# Patient Record
Sex: FEMALE | Race: WHITE | NOT HISPANIC OR LATINO | Employment: FULL TIME | URBAN - METROPOLITAN AREA
[De-identification: names, ages, dates, MRNs, and addresses within clinical notes are randomized per-mention and may not be internally consistent; named-entity substitution may affect disease eponyms.]

---

## 2017-01-20 ENCOUNTER — OFFICE VISIT (OUTPATIENT)
Dept: MEDICAL GROUP | Facility: PHYSICIAN GROUP | Age: 52
End: 2017-01-20
Payer: OTHER GOVERNMENT

## 2017-01-20 VITALS
DIASTOLIC BLOOD PRESSURE: 74 MMHG | HEIGHT: 61 IN | BODY MASS INDEX: 23.41 KG/M2 | WEIGHT: 124 LBS | SYSTOLIC BLOOD PRESSURE: 98 MMHG | OXYGEN SATURATION: 96 % | HEART RATE: 40 BPM | TEMPERATURE: 97.5 F

## 2017-01-20 DIAGNOSIS — M85.80 OSTEOPENIA: ICD-10-CM

## 2017-01-20 DIAGNOSIS — J30.89 NON-SEASONAL ALLERGIC RHINITIS DUE TO OTHER ALLERGIC TRIGGER: ICD-10-CM

## 2017-01-20 DIAGNOSIS — L70.8 OTHER ACNE: ICD-10-CM

## 2017-01-20 PROCEDURE — 99214 OFFICE O/P EST MOD 30 MIN: CPT | Performed by: FAMILY MEDICINE

## 2017-01-20 NOTE — MR AVS SNAPSHOT
"Suzette Bazzicholo   2017 3:00 PM   Office Visit   MRN: [id number]    Department:  [facility]   Dept Phone:  [phone]    Description:  Female : 1965   Provider:  Ceci Flores D.O.           Allergies as of 2017     Allergen Noted Reactions    Bee Venom 2015         You were diagnosed with     Non-seasonal allergic rhinitis due to other allergic trigger   [1656971]       Other acne   [706.1.ICD-9-CM]       Osteopenia   [274483]         Vital Signs     Blood Pressure Pulse Temperature Height Weight Body Mass Index    98/74 mmHg 40 36.4 °C (97.5 °F) 1.549 m (5' 0.98\") 56.246 kg (124 lb) 23.44 kg/m2    Oxygen Saturation Last Menstrual Period Smoking Status             96% 2001 Current Some Day Smoker         Basic Information     Date Of Birth Sex Race Ethnicity Preferred Language    1965 Female White Unknown English      Problem List              ICD-10-CM Priority Class Noted - Resolved    Dyslipidemia E78.5   Unknown - Present    Genital herpes in women A60.09   2013 - Present    Allergic rhinitis due to allergen J30.9   2014 - Present    Acne L70.9   10/16/2014 - Present    Exercise-induced asthma J45.990   2015 - Present    Osteopenia M85.80   2015 - Present    Healthcare maintenance Z00.00   2015 - Present      Health Maintenance        Date Due Completion Dates    IMM INFLUENZA (1) 2016    PAP SMEAR 2018 (Originally 2016) 2013, 2013 (Done), 2010    Override on 2013: Done    MAMMOGRAM 2017, 2015, 10/15/2014, 2013, 2012, 2011    COLONOSCOPY 2023    IMM DTaP/Tdap/Td Vaccine (2 - Td) 10/2/2024 10/2/2014            Current Immunizations     Influenza TIV (IM) 2011    Influenza Vaccine 10/1/2010    Tdap Vaccine 10/2/2014      Below and/or attached are the medications your provider expects you to take. Review all of your home medications and " newly ordered medications with your provider and/or pharmacist. Follow medication instructions as directed by your provider and/or pharmacist. Please keep your medication list with you and share with your provider. Update the information when medications are discontinued, doses are changed, or new medications (including over-the-counter products) are added; and carry medication information at all times in the event of emergency situations     Allergies:  BEE VENOM - (reactions not documented)               Medications  Valid as of: January 20, 2017 -  3:34 PM    Generic Name Brand Name Tablet Size Instructions for use    Albuterol Sulfate (Aero Soln) albuterol 108 (90 BASE) MCG/ACT Inhale 2 Puffs by mouth every 6 hours as needed.        Erythromycin (Solution) THERAMYCIN 2 % APPLY TO FACE TWICE A DAY.        ValACYclovir HCl (Tab) VALTREX 1 GM Take 1 Tab by mouth 1 time daily as needed (take for 5 days when having an outbreak).        .                 Medicines prescribed today were sent to:     [facility]/PHARMACY #[id number] - [place], [place] - [address] [address]    Phone: [phone] Fax: [phone]    Open 24 Hours?: No      Medication refill instructions:       If your prescription bottle indicates you have medication refills left, it is not necessary to call your provider’s office. Please contact your pharmacy and they will refill your medication.    If your prescription bottle indicates you do not have any refills left, you may request refills at any time through one of the following ways: The online [employer]aborate system (except Urgent Care), by calling your provider’s office, or by asking your pharmacy to contact your provider’s office with a refill request. Medication refills are processed only during regular business hours and may not be available until the next business day. Your provider may request additional information or to have a follow-up visit with  you prior to refilling your medication.   *Please Note: Medication refills are assigned a new Rx number when refilled electronically. Your pharmacy may indicate that no refills were authorized even though a new prescription for the same medication is available at the pharmacy. Please request the medicine by name with the pharmacy before contacting your provider for a refill.        Your To Do List     Future Labs/Procedures Complete By Expires    COMP METABOLIC PANEL  As directed 1/21/2018    LIPID PROFILE  As directed 1/21/2018    VITAMIN D,25 HYDROXY  As directed 1/21/2018         [employer] Access Code: Activation code not generated  Current [employer] Status: Active

## 2017-01-21 ENCOUNTER — HOSPITAL ENCOUNTER (OUTPATIENT)
Dept: LAB | Facility: MEDICAL CENTER | Age: 52
End: 2017-01-21
Attending: FAMILY MEDICINE
Payer: OTHER GOVERNMENT

## 2017-01-21 DIAGNOSIS — M85.80 OSTEOPENIA: ICD-10-CM

## 2017-01-21 LAB
25(OH)D3 SERPL-MCNC: 32 NG/ML (ref 30–100)
ALBUMIN SERPL BCP-MCNC: 4.2 G/DL (ref 3.2–4.9)
ALBUMIN/GLOB SERPL: 1.4 G/DL
ALP SERPL-CCNC: 51 U/L (ref 30–99)
ALT SERPL-CCNC: 16 U/L (ref 2–50)
ANION GAP SERPL CALC-SCNC: 7 MMOL/L (ref 0–11.9)
AST SERPL-CCNC: 19 U/L (ref 12–45)
BILIRUB SERPL-MCNC: 0.6 MG/DL (ref 0.1–1.5)
BUN SERPL-MCNC: 18 MG/DL (ref 8–22)
CALCIUM SERPL-MCNC: 9.2 MG/DL (ref 8.5–10.5)
CHLORIDE SERPL-SCNC: 106 MMOL/L (ref 96–112)
CHOLEST SERPL-MCNC: 205 MG/DL (ref 100–199)
CO2 SERPL-SCNC: 25 MMOL/L (ref 20–33)
CREAT SERPL-MCNC: 0.9 MG/DL (ref 0.5–1.4)
GLOBULIN SER CALC-MCNC: 2.9 G/DL (ref 1.9–3.5)
GLUCOSE SERPL-MCNC: 86 MG/DL (ref 65–99)
HDLC SERPL-MCNC: 77 MG/DL
LDLC SERPL CALC-MCNC: 109 MG/DL
POTASSIUM SERPL-SCNC: 3.9 MMOL/L (ref 3.6–5.5)
PROT SERPL-MCNC: 7.1 G/DL (ref 6–8.2)
SODIUM SERPL-SCNC: 138 MMOL/L (ref 135–145)
TRIGL SERPL-MCNC: 96 MG/DL (ref 0–149)

## 2017-01-21 PROCEDURE — 36415 COLL VENOUS BLD VENIPUNCTURE: CPT

## 2017-01-21 PROCEDURE — 82306 VITAMIN D 25 HYDROXY: CPT

## 2017-01-21 PROCEDURE — 80061 LIPID PANEL: CPT

## 2017-01-21 PROCEDURE — 80053 COMPREHEN METABOLIC PANEL: CPT

## 2017-01-21 NOTE — ASSESSMENT & PLAN NOTE
Ongoing issue. Patient reports that she does have issues with stuffy nose and nasal congestion; she denies that it is bothersome to her most days. She denies any fevers or chills associated with this. In the past she has used Flonase but does not like the side effect of the strife scratchy throat.

## 2017-01-21 NOTE — ASSESSMENT & PLAN NOTE
Ongoing issue. Previous DEXA scan did show the patient had osteopenia. She denies any long bone or vertebral fractures. She currently is taking supplemental calcium and vitamin D.

## 2017-01-21 NOTE — ASSESSMENT & PLAN NOTE
Ongoing issue. Patient reports that she periodically uses topical erythromycin cream for flareups. She states that it is controlling her acne. Otherwise she is doing well. She says of acne primarily is on her face and neck and does not extend down to her chest or back. She currently does not have any partials of concern.

## 2017-01-21 NOTE — PROGRESS NOTES
"Subjective:   Suzette Anderson is a 51 y.o. female here today for acne; nasal congestion; osteopenia    Acne  Ongoing issue. Patient reports that she periodically uses topical erythromycin cream for flareups. She states that it is controlling her acne. Otherwise she is doing well. She says of acne primarily is on her face and neck and does not extend down to her chest or back. She currently does not have any partials of concern.    Allergic rhinitis due to allergen  Ongoing issue. Patient reports that she does have issues with stuffy nose and nasal congestion; she denies that it is bothersome to her most days. She denies any fevers or chills associated with this. In the past she has used Flonase but does not like the side effect of the strife scratchy throat.    Osteopenia  Ongoing issue. Previous DEXA scan did show the patient had osteopenia. She denies any long bone or vertebral fractures. She currently is taking supplemental calcium and vitamin D.     Pt is here today to [facility]; she is transferring care from [facility].     Current medicines (including changes today)  Current Outpatient Prescriptions   Medication Sig Dispense Refill   • valacyclovir (VALTREX) 1 GM TABS Take 1 Tab by mouth 1 time daily as needed (take for 5 days when having an outbreak). 20 Tab 3   • erythromycin with ethanol (THERAMYCIN) 2 % external solution APPLY TO FACE TWICE A DAY. (Patient not taking: Reported on 1/20/2017) 60 mL 0   • albuterol (PROAIR HFA) 108 (90 BASE) MCG/ACT AERS inhalation aerosol Inhale 2 Puffs by mouth every 6 hours as needed. 8.5 g 3     No current facility-administered medications for this visit.     She  has a past medical history of Osteopenia; Dyslipidemia; Acne; and Allergic rhinitis due to allergen (4/2/2014).    ROS   No chest pain, no shortness of breath, no abdominal pain  +nasal congestion     Objective:     Blood pressure 98/74, pulse 40, temperature 36.4 °C (97.5 °F), height 1.549 m (5' 0.98\"), " weight 56.246 kg (124 lb), last menstrual period 01/01/2001, SpO2 96 %. Body mass index is 23.44 kg/(m^2).   Physical Exam:  Alert, oriented in no acute distress.  Eye contact is good, speech goal directed, affect calm  HEENT: conjunctiva non-injected, sclera non-icteric.  Pinna normal. TM pearly gray.   Oral mucous membranes pink and moist with no lesions.  Neck No adenopathy or masses in the neck or supraclavicular regions.  Lungs: clear to auscultation bilaterally with good excursion.  CV: regular rate and rhythm.  Abdomen: soft, nontender, No CVAT  Ext: no edema, color normal, vascularity normal, temperature normal  Neuro: CN 2-12 grossly intact      Assessment and Plan:   The following treatment plan was discussed     1. Non-seasonal allergic rhinitis due to other allergic trigger      Stable. Recommend over-the-counter saline nasal wash for symptom management. Monitor   2. Other acne      Stable. Continue erythromycin cream as needed for management; patient denies need for refill. Monitor   3. Osteopenia  COMP METABOLIC PANEL    LIPID PROFILE    VITAMIN D,25 HYDROXY    Stable. Continue supplemental calcium and vitamin D; sent for age appropriate screening labs and monitor for results.       Followup: Return in about 1 year (around 1/20/2018) for annual physical , Short.

## 2017-01-23 ENCOUNTER — TELEPHONE (OUTPATIENT)
Dept: MEDICAL GROUP | Facility: PHYSICIAN GROUP | Age: 52
End: 2017-01-23

## 2017-01-23 NOTE — TELEPHONE ENCOUNTER
----- Message from Ceci Flores D.O. sent at 1/23/2017  7:47 AM PST -----  Please advise pt that all labs are in a normal range except the total cholesterol which is only slightly above normal.  Just watch intake of high fat foods and be sure to get 30 minutes of exercise daily.    Ceci Flores D.O.

## 2017-01-23 NOTE — Clinical Note
January 23, 2017         Suzette Anderson  [address] [place] [address]        Dear Suzette:    All labs are in a normal range except the total cholesterol which is only slightly above normal.  Just watch intake of high fat foods and be sure to get 30 minutes of exercise daily.      Resulted Orders   COMP METABOLIC PANEL   Result Value Ref Range    Sodium 138 135 - 145 mmol/L    Potassium 3.9 3.6 - 5.5 mmol/L    Chloride 106 96 - 112 mmol/L    Co2 25 20 - 33 mmol/L    Anion Gap 7.0 0.0 - 11.9    Glucose 86 65 - 99 mg/dL    Bun 18 8 - 22 mg/dL    Creatinine 0.90 0.50 - 1.40 mg/dL    Calcium 9.2 8.5 - 10.5 mg/dL    AST(SGOT) 19 12 - 45 U/L    ALT(SGPT) 16 2 - 50 U/L    Alkaline Phosphatase 51 30 - 99 U/L    Total Bilirubin 0.6 0.1 - 1.5 mg/dL    Albumin 4.2 3.2 - 4.9 g/dL    Total Protein 7.1 6.0 - 8.2 g/dL    Globulin 2.9 1.9 - 3.5 g/dL    A-G Ratio 1.4 g/dL    Narrative    Request patient fasting?->Yes   LIPID PROFILE   Result Value Ref Range    Cholesterol,Tot 205 (H) 100 - 199 mg/dL    Triglycerides 96 0 - 149 mg/dL    HDL 77 >=40 mg/dL     (H) <100 mg/dL    Narrative    Request patient fasting?->Yes   VITAMIN D,25 HYDROXY   Result Value Ref Range    25-Hydroxy   Vitamin D 25 32 30 - 100 ng/mL      Comment:      Adult Ranges:   <20 ng/mL - Deficiency  20-29 ng/mL - Insufficiency   ng/mL - Sufficiency  The [employer] Centaur Vitamin D Assay is standardized to the  [facility] reference measurement procedures, a  reference method for the Vitamin D Standardization Program  (VDSP).  The VDSP aligns patient results among 25 (OH)  Vitamin D methods.      Narrative    Request patient fasting?->Yes   ESTIMATED GFR   Result Value Ref Range    GFR If African American >60 >60 mL/min/1.73 m 2    GFR If Non African American >60 >60 mL/min/1.73 m 2    Narrative    Request patient fasting?->Yes         If you have any questions or concerns, please don't hesitate to call.        Sincerely,      Ceci WHITAKER  JENIFER Flores.    Electronically Signed

## 2017-02-15 ENCOUNTER — OFFICE VISIT (OUTPATIENT)
Dept: MEDICAL GROUP | Facility: PHYSICIAN GROUP | Age: 52
End: 2017-02-15
Payer: OTHER GOVERNMENT

## 2017-02-15 ENCOUNTER — SUPERVISING PHYSICIAN REVIEW (OUTPATIENT)
Dept: MEDICAL GROUP | Facility: PHYSICIAN GROUP | Age: 52
End: 2017-02-15

## 2017-02-15 VITALS
SYSTOLIC BLOOD PRESSURE: 106 MMHG | WEIGHT: 120 LBS | DIASTOLIC BLOOD PRESSURE: 78 MMHG | HEIGHT: 61 IN | BODY MASS INDEX: 22.66 KG/M2 | OXYGEN SATURATION: 97 % | RESPIRATION RATE: 12 BRPM | HEART RATE: 67 BPM | TEMPERATURE: 96.6 F

## 2017-02-15 DIAGNOSIS — R09.81 SINUS CONGESTION: ICD-10-CM

## 2017-02-15 PROCEDURE — 99213 OFFICE O/P EST LOW 20 MIN: CPT | Performed by: NURSE PRACTITIONER

## 2017-02-15 RX ORDER — FLUTICASONE PROPIONATE 50 MCG
1 SPRAY, SUSPENSION (ML) NASAL DAILY
Qty: 16 G | Refills: 0 | Status: SHIPPED | OUTPATIENT
Start: 2017-02-15 | End: 2017-03-20 | Stop reason: SDUPTHER

## 2017-02-15 ASSESSMENT — PATIENT HEALTH QUESTIONNAIRE - PHQ9: CLINICAL INTERPRETATION OF PHQ2 SCORE: 0

## 2017-02-15 NOTE — PROGRESS NOTES
"Subjective:     Chief Complaint   Patient presents with   • Sinus Problem     congestion, SOB, headaches, sinus pressure       HPI  Suzette Anderson is a 51 y.o. Female, patient of Dr. Flores, here today for c/o sinus symptoms that started last week. She has sinus pressure in frontal region. No tooth pain. No sore throat. No rhinorrhea, but nose is congested and hard to breathe. No eye redness or lid edema. No ear pain or pressure. Sneezing more than normal, although has had rhinorrhea and sneezing since moving to [place] years ago. Treatments tried: OTC decongestant for 2 days, and allegra today. Has humidifier in bedroom. No fever, chills, decreased appetite, or malaise. No sob, wheezing, dyspnea, or cough. Received flu vaccine this year.     The encounter diagnosis was Sinus congestion.    Allergies: Bee venom  Current medicines (including changes today)  Current Outpatient Prescriptions   Medication Sig Dispense Refill   • fluticasone (FLONASE) 50 MCG/ACT nasal spray Spray 1 Spray in nose every day. 16 g 0   • valacyclovir (VALTREX) 1 GM TABS Take 1 Tab by mouth 1 time daily as needed (take for 5 days when having an outbreak). 20 Tab 3   • erythromycin with ethanol (THERAMYCIN) 2 % external solution APPLY TO FACE TWICE A DAY. (Patient not taking: Reported on 1/20/2017) 60 mL 0   • albuterol (PROAIR HFA) 108 (90 BASE) MCG/ACT AERS inhalation aerosol Inhale 2 Puffs by mouth every 6 hours as needed. 8.5 g 3     No current facility-administered medications for this visit.       She  has a past medical history of Osteopenia; Dyslipidemia; Acne; and Allergic rhinitis due to allergen (4/2/2014).    Health Maintenance: UTD    ROS  As stated in HPI      Objective:     Blood pressure 106/78, pulse 67, temperature 35.9 °C (96.6 °F), resp. rate 12, height 1.549 m (5' 1\"), weight 54.432 kg (120 lb), last menstrual period 01/01/2001, SpO2 97 %. Body mass index is 22.69 kg/(m^2).  Physical Exam:  General: Alert, oriented, in " no acute distress.  Eye contact is good, speech goal directed, affect calm  HEENT: conjunctiva non-injected, sclera non-icteric, EOMs intact. No lid edema   Pinna normal without skin lesions. TM pearly gray.   Nasal turbinates without edema or drainage. Mucosa dry and pale  Oral mucous membranes pink and moist with no lesions. Oropharynx without erythema, or exudate.   Mild tenderness with palpation of frontal sinuses. No maxillary sinus tenderness.   Neck: No adenopathy or masses in the cervical or supraclavicular regions.   Lungs: unlabored. clear to auscultation bilaterally with good excursion.  CV: regular rate and rhythm. No murmurs.        Assessment and Plan:   The following treatment plan was discussed  1. Sinus congestion  Symptoms appear to be allergic to me. No obvious sign of bacterial infection.   Recommended continuing nasal decongestant for another 2 days twice daily in addition to Flonase.   Start nasal saline rinses or neti-pot and apply vaseline or nasal saline spray to prevent further drying of nares with use of Flonase  Continue daily allegra, humidifier  Start sky's vapor rub under nose     Call if no improvement in 1 week.     fluticasone (FLONASE) 50 MCG/ACT nasal spray       Followup: Return if symptoms worsen or fail to improve. sooner should new symptoms or problems arise.

## 2017-02-15 NOTE — MR AVS SNAPSHOT
"        Suzette Mikegustavo   2/15/2017 3:00 PM   Office Visit   MRN: [id number]    Department:  [facility]   Dept Phone:  [phone]    Description:  Female : 1965   Provider:  GIANNA Ely           Reason for Visit     Sinus Problem congestion, SOB, headaches, sinus pressure      Allergies as of 2/15/2017     Allergen Noted Reactions    Bee Venom 2015         You were diagnosed with     Sinus congestion   [657105]         Vital Signs     Blood Pressure Pulse Temperature Respirations Height Weight    106/78 mmHg 67 35.9 °C (96.6 °F) 12 1.549 m (5' 1\") 54.432 kg (120 lb)    Body Mass Index Oxygen Saturation Last Menstrual Period Smoking Status          22.69 kg/m2 71% 2001 Current Some Day Smoker        Basic Information     Date Of Birth Sex Race Ethnicity Preferred Language    1965 Female White Unknown English      Your appointments     2018  2:00 PM   [facility] [address]   [phone]           You will be receiving a confirmation call a few days before your appointment from our automated call confirmation system.              Problem List              ICD-10-CM Priority Class Noted - Resolved    Dyslipidemia E78.5   Unknown - Present    Genital herpes in women A60.09   2013 - Present    Allergic rhinitis due to allergen J30.9   2014 - Present    Acne L70.9   10/16/2014 - Present    Exercise-induced asthma J45.990   2015 - Present    Osteopenia M85.80   2015 - Present    Healthcare maintenance Z00.00   2015 - Present      Health Maintenance        Date Due Completion Dates    IMM INFLUENZA (1) 2016    PAP SMEAR 2018 (Originally 2016) 2013, 2013 (Done), 2010    Override on 2013: Done    MAMMOGRAM 2017, 2015, 10/15/2014, 2013, 2012, 2011    COLONOSCOPY " 9/16/2023 9/16/2013    IMM DTaP/Tdap/Td Vaccine (2 - Td) 10/2/2024 10/2/2014            Current Immunizations     Influenza TIV (IM) 11/1/2011    Influenza Vaccine 10/1/2010    Tdap Vaccine 10/2/2014      Below and/or attached are the medications your provider expects you to take. Review all of your home medications and newly ordered medications with your provider and/or pharmacist. Follow medication instructions as directed by your provider and/or pharmacist. Please keep your medication list with you and share with your provider. Update the information when medications are discontinued, doses are changed, or new medications (including over-the-counter products) are added; and carry medication information at all times in the event of emergency situations     Allergies:  BEE VENOM - (reactions not documented)               Medications  Valid as of: February 15, 2017 -  3:25 PM    Generic Name Brand Name Tablet Size Instructions for use    Albuterol Sulfate (Aero Soln) albuterol 108 (90 BASE) MCG/ACT Inhale 2 Puffs by mouth every 6 hours as needed.        Erythromycin (Solution) THERAMYCIN 2 % APPLY TO FACE TWICE A DAY.        Fluticasone Propionate (Suspension) FLONASE 50 MCG/ACT Spray 1 Spray in nose every day.        ValACYclovir HCl (Tab) VALTREX 1 GM Take 1 Tab by mouth 1 time daily as needed (take for 5 days when having an outbreak).        .                 Medicines prescribed today were sent to:     [facility]/PHARMACY #[id number] - [place], [place] - [address] [place] [address] AT [address] [address]    Phone: [phone] Fax: [phone]    Open 24 Hours?: No      Medication refill instructions:       If your prescription bottle indicates you have medication refills left, it is not necessary to call your provider’s office. Please contact your pharmacy and they will refill your medication.    If your prescription bottle indicates you do not have any refills left, you may request refills at  any time through one of the following ways: The online [employer] system (except Urgent Care), by calling your provider’s office, or by asking your pharmacy to contact your provider’s office with a refill request. Medication refills are processed only during regular business hours and may not be available until the next business day. Your provider may request additional information or to have a follow-up visit with you prior to refilling your medication.   *Please Note: Medication refills are assigned a new Rx number when refilled electronically. Your pharmacy may indicate that no refills were authorized even though a new prescription for the same medication is available at the pharmacy. Please request the medicine by name with the pharmacy before contacting your provider for a refill.           [employer] Access Code: Activation code not generated  Current [employer] Status: Active

## 2017-02-16 NOTE — PROGRESS NOTES
I have reviewed and agree with history, assessment and plan for office encounter on 2/15/2017 with JIN Ely  Face to face encounter/direct observation: No  Suggested changes to plan or follow-up: none  I have reviewed this note on 2/15/17   Ceci Flores D.O.

## 2017-03-08 DIAGNOSIS — L70.9 ACNE, UNSPECIFIED ACNE TYPE: ICD-10-CM

## 2017-03-08 NOTE — TELEPHONE ENCOUNTER
From: Suzette Anderson  To: GIANNA Ahuja  Sent: 3/8/2017 2:03 PM PST  Subject: Medication Renewal Request    Original authorizing provider: GIANNA Ahuja would like a refill of the following medications:  erythromycin with ethanol (THERAMYCIN) 2 % external solution [GIANNA Ahuja]    Preferred pharmacy: [facility]    Comment:

## 2017-03-09 RX ORDER — ERYTHROMYCIN 20 MG/ML
SOLUTION TOPICAL
Qty: 60 ML | Refills: 0 | Status: SHIPPED | OUTPATIENT
Start: 2017-03-09 | End: 2017-03-20 | Stop reason: SDUPTHER

## 2017-03-20 DIAGNOSIS — R09.81 SINUS CONGESTION: ICD-10-CM

## 2017-03-20 DIAGNOSIS — A60.09 GENITAL HERPES IN WOMEN: ICD-10-CM

## 2017-03-20 DIAGNOSIS — L70.9 ACNE, UNSPECIFIED ACNE TYPE: ICD-10-CM

## 2017-03-20 RX ORDER — VALACYCLOVIR HYDROCHLORIDE 1 G/1
1000 TABLET, FILM COATED ORAL
Qty: 20 TAB | Refills: 3 | Status: SHIPPED | OUTPATIENT
Start: 2017-03-20 | End: 2018-04-08 | Stop reason: SDUPTHER

## 2017-03-20 RX ORDER — FLUTICASONE PROPIONATE 50 MCG
1 SPRAY, SUSPENSION (ML) NASAL DAILY
Qty: 16 G | Refills: 3 | Status: SHIPPED | OUTPATIENT
Start: 2017-03-20 | End: 2017-03-24 | Stop reason: SDUPTHER

## 2017-03-20 RX ORDER — ERYTHROMYCIN 20 MG/ML
SOLUTION TOPICAL
Qty: 60 ML | Refills: 0 | Status: SHIPPED | OUTPATIENT
Start: 2017-03-20 | End: 2017-03-24 | Stop reason: SDUPTHER

## 2017-03-20 NOTE — TELEPHONE ENCOUNTER
Was the patient seen in the last year in this department? Yes  02/15/17    Does patient have an active prescription for medications requested? No     Received Request Via: Pharmacy

## 2017-03-20 NOTE — TELEPHONE ENCOUNTER
Was the patient seen in the last year in this department? Yes  2/15/17    Does patient have an active prescription for medications requested? No     Received Request Via: Pharmacy

## 2017-03-24 DIAGNOSIS — R09.81 SINUS CONGESTION: ICD-10-CM

## 2017-03-24 DIAGNOSIS — L70.9 ACNE, UNSPECIFIED ACNE TYPE: ICD-10-CM

## 2017-03-24 RX ORDER — ERYTHROMYCIN 20 MG/ML
SOLUTION TOPICAL
Qty: 60 ML | Refills: 3 | Status: SHIPPED | OUTPATIENT
Start: 2017-03-24 | End: 2017-03-27 | Stop reason: SDUPTHER

## 2017-03-24 RX ORDER — FLUTICASONE PROPIONATE 50 MCG
1 SPRAY, SUSPENSION (ML) NASAL DAILY
Qty: 16 G | Refills: 6 | Status: SHIPPED | OUTPATIENT
Start: 2017-03-24 | End: 2017-03-27 | Stop reason: SDUPTHER

## 2017-03-27 DIAGNOSIS — L70.9 ACNE, UNSPECIFIED ACNE TYPE: ICD-10-CM

## 2017-03-27 DIAGNOSIS — R09.81 SINUS CONGESTION: ICD-10-CM

## 2017-03-27 RX ORDER — ERYTHROMYCIN 20 MG/ML
SOLUTION TOPICAL
Qty: 60 ML | Refills: 3 | Status: SHIPPED | OUTPATIENT
Start: 2017-03-27 | End: 2018-01-19

## 2017-03-27 RX ORDER — FLUTICASONE PROPIONATE 50 MCG
1 SPRAY, SUSPENSION (ML) NASAL DAILY
Qty: 16 G | Refills: 5 | Status: SHIPPED | OUTPATIENT
Start: 2017-03-27 | End: 2018-03-02 | Stop reason: SDUPTHER

## 2017-03-27 NOTE — TELEPHONE ENCOUNTER
Was the patient seen in the last year in this department? Yes     Does patient have an active prescription for medications requested? No    Received Request Via: Pharmacy    [facility]

## 2017-05-12 ENCOUNTER — TELEPHONE (OUTPATIENT)
Dept: MEDICAL GROUP | Facility: PHYSICIAN GROUP | Age: 52
End: 2017-05-12

## 2017-05-12 DIAGNOSIS — L70.8 OTHER ACNE: ICD-10-CM

## 2017-07-06 ENCOUNTER — RX ONLY (OUTPATIENT)
Age: 52
Setting detail: RX ONLY
End: 2017-07-06

## 2017-12-29 ENCOUNTER — OFFICE VISIT (OUTPATIENT)
Dept: URGENT CARE | Facility: PHYSICIAN GROUP | Age: 52
End: 2017-12-29
Payer: OTHER GOVERNMENT

## 2017-12-29 VITALS
OXYGEN SATURATION: 98 % | BODY MASS INDEX: 21.52 KG/M2 | TEMPERATURE: 98.7 F | HEIGHT: 61 IN | HEART RATE: 63 BPM | DIASTOLIC BLOOD PRESSURE: 74 MMHG | SYSTOLIC BLOOD PRESSURE: 110 MMHG | WEIGHT: 114 LBS

## 2017-12-29 DIAGNOSIS — R10.13 ACUTE EPIGASTRIC PAIN: ICD-10-CM

## 2017-12-29 PROCEDURE — 99213 OFFICE O/P EST LOW 20 MIN: CPT | Performed by: NURSE PRACTITIONER

## 2017-12-29 RX ORDER — OMEPRAZOLE 20 MG/1
20 CAPSULE, DELAYED RELEASE ORAL DAILY
Qty: 30 CAP | Refills: 0 | Status: SHIPPED | OUTPATIENT
Start: 2017-12-29 | End: 2018-01-19

## 2017-12-29 ASSESSMENT — PAIN SCALES - GENERAL: PAINLEVEL: NO PAIN

## 2017-12-30 ENCOUNTER — HOSPITAL ENCOUNTER (OUTPATIENT)
Dept: LAB | Facility: MEDICAL CENTER | Age: 52
End: 2017-12-30
Attending: NURSE PRACTITIONER
Payer: OTHER GOVERNMENT

## 2017-12-30 DIAGNOSIS — R10.10 UPPER ABDOMINAL PAIN: ICD-10-CM

## 2017-12-30 LAB
ALBUMIN SERPL BCP-MCNC: 4.3 G/DL (ref 3.2–4.9)
ALBUMIN/GLOB SERPL: 1.4 G/DL
ALP SERPL-CCNC: 55 U/L (ref 30–99)
ALT SERPL-CCNC: 21 U/L (ref 2–50)
ANION GAP SERPL CALC-SCNC: 9 MMOL/L (ref 0–11.9)
AST SERPL-CCNC: 27 U/L (ref 12–45)
BASOPHILS # BLD AUTO: 0.6 % (ref 0–1.8)
BASOPHILS # BLD: 0.03 K/UL (ref 0–0.12)
BILIRUB SERPL-MCNC: 0.5 MG/DL (ref 0.1–1.5)
BUN SERPL-MCNC: 11 MG/DL (ref 8–22)
CALCIUM SERPL-MCNC: 10.1 MG/DL (ref 8.5–10.5)
CHLORIDE SERPL-SCNC: 102 MMOL/L (ref 96–112)
CO2 SERPL-SCNC: 29 MMOL/L (ref 20–33)
CREAT SERPL-MCNC: 0.97 MG/DL (ref 0.5–1.4)
EOSINOPHIL # BLD AUTO: 0.11 K/UL (ref 0–0.51)
EOSINOPHIL NFR BLD: 2.3 % (ref 0–6.9)
ERYTHROCYTE [DISTWIDTH] IN BLOOD BY AUTOMATED COUNT: 41.9 FL (ref 35.9–50)
GFR SERPL CREATININE-BSD FRML MDRD: 60 ML/MIN/1.73 M 2
GLOBULIN SER CALC-MCNC: 3.1 G/DL (ref 1.9–3.5)
GLUCOSE SERPL-MCNC: 76 MG/DL (ref 65–99)
HCT VFR BLD AUTO: 44.6 % (ref 37–47)
HGB BLD-MCNC: 15.4 G/DL (ref 12–16)
IMM GRANULOCYTES # BLD AUTO: 0 K/UL (ref 0–0.11)
IMM GRANULOCYTES NFR BLD AUTO: 0 % (ref 0–0.9)
LIPASE SERPL-CCNC: 21 U/L (ref 11–82)
LYMPHOCYTES # BLD AUTO: 1.48 K/UL (ref 1–4.8)
LYMPHOCYTES NFR BLD: 30.5 % (ref 22–41)
MCH RBC QN AUTO: 32.2 PG (ref 27–33)
MCHC RBC AUTO-ENTMCNC: 34.5 G/DL (ref 33.6–35)
MCV RBC AUTO: 93.3 FL (ref 81.4–97.8)
MONOCYTES # BLD AUTO: 0.48 K/UL (ref 0–0.85)
MONOCYTES NFR BLD AUTO: 9.9 % (ref 0–13.4)
NEUTROPHILS # BLD AUTO: 2.75 K/UL (ref 2–7.15)
NEUTROPHILS NFR BLD: 56.7 % (ref 44–72)
NRBC # BLD AUTO: 0 K/UL
NRBC BLD-RTO: 0 /100 WBC
PLATELET # BLD AUTO: 304 K/UL (ref 164–446)
PMV BLD AUTO: 9.1 FL (ref 9–12.9)
POTASSIUM SERPL-SCNC: 3.8 MMOL/L (ref 3.6–5.5)
PROT SERPL-MCNC: 7.4 G/DL (ref 6–8.2)
RBC # BLD AUTO: 4.78 M/UL (ref 4.2–5.4)
SODIUM SERPL-SCNC: 140 MMOL/L (ref 135–145)
WBC # BLD AUTO: 4.9 K/UL (ref 4.8–10.8)

## 2017-12-30 PROCEDURE — 85025 COMPLETE CBC W/AUTO DIFF WBC: CPT

## 2017-12-30 PROCEDURE — 83690 ASSAY OF LIPASE: CPT

## 2017-12-30 PROCEDURE — 36415 COLL VENOUS BLD VENIPUNCTURE: CPT

## 2017-12-30 PROCEDURE — 80053 COMPREHEN METABOLIC PANEL: CPT

## 2017-12-30 NOTE — PROGRESS NOTES
Chief Complaint   Patient presents with   • Abdominal Pain     Upper Quad Abdominal pain x3 days . Pt has hx of IBS .        HISTORY OF PRESENT ILLNESS: Patient is a 52 y.o. female who presents to urgent care today With complaints of upper abdominal pain. She states that she has had intermittent, crampy, and sharp upper abdominal pain for the past 3 days. Pain is rated 5/10 at its worst. She denies associated back pain, weakness, dizziness, nausea, vomiting, diarrhea, fever, chills, changes in her urination, shortness of breath, or blood or black in her stools. She has taken Pepto-Bismol with some improvement of symptoms. She denies previous history of the same. She states she does have a history of IBS but this feels somewhat different. She denies heavy alcohol use.        Patient Active Problem List    Diagnosis Date Noted   • Osteopenia 09/28/2015   • Healthcare maintenance 09/28/2015   • Exercise-induced asthma 05/06/2015   • Acne 10/16/2014   • Allergic rhinitis due to allergen 04/02/2014   • Genital herpes in women 07/24/2013   • Dyslipidemia        Allergies:Bee venom    Current Outpatient Prescriptions Ordered in [facility]   Medication Sig Dispense Refill   • erythromycin with ethanol (THERAMYCIN) 2 % external solution APPLY TO FACE TWICE A DAY. 60 mL 3   • fluticasone (FLONASE) 50 MCG/ACT nasal spray Spray 1 Spray in nose every day. 16 g 5   • valacyclovir (VALTREX) 1 GM Tab Take 1 Tab by mouth 1 time daily as needed (take for 5 days when having an outbreak). 20 Tab 3   • albuterol (PROAIR HFA) 108 (90 BASE) MCG/ACT AERS inhalation aerosol Inhale 2 Puffs by mouth every 6 hours as needed. 8.5 g 3     No current [facility]-ordered facility-administered medications on file.        Past Medical History:   Diagnosis Date   • Acne    • Allergic rhinitis due to allergen 4/2/2014   • Dyslipidemia    • Osteopenia        Social History   Substance Use Topics   • Smoking status: Current Some Day Smoker     Packs/day: 0.25      "Years: 10.00     Types: Cigarettes   • Smokeless tobacco: Never Used      Comment: 3-5 cigs per day on weekends, smoked off and on since age 14   • Alcohol use Yes      Comment: wine nightly       Family Status   Relation Status   • Mother Alive   • Father Alive   • Sister Alive   • Brother Alive     Family History   Problem Relation Age of Onset   • Arthritis Mother      rheumatoid    • Diabetes Paternal Grandfather    • Stroke Paternal Grandfather    • Hypertension Father    • Hyperlipidemia Father    • Cancer Neg Hx        ROS:  Review of Systems   Constitutional: Negative for fever, chills, weight loss, malaise, and fatigue.   HENT: Negative for ear pain, nosebleeds, congestion, sore throat and neck pain.    Eyes: Negative for vision changes.   Neuro: Negative for headache, sensory changes, weakness, seizure, LOC.   Cardiovascular: Negative for chest pain, palpitations, orthopnea and leg swelling.   Respiratory: Negative for cough, sputum production, shortness of breath and wheezing.   Gastrointestinal:Positive for abdominal pain. Negative for nausea, vomiting or diarrhea.   Genitourinary: Negative for dysuria, urgency and frequency.  Musculoskeletal: Negative for falls, neck pain, back pain, joint pain, myalgias.   Skin: Negative for rash, diaphoresis.     Exam:  Blood pressure 110/74, pulse 63, temperature 37.1 °C (98.7 °F), height 1.549 m (5' 1\"), weight 51.7 kg (114 lb), last menstrual period 01/01/2001, SpO2 98 %, not currently breastfeeding.  General: well-nourished, well-developed female in NAD  Head: normocephalic, atraumatic  Eyes: PERRLA, no conjunctival injection, acuity grossly intact, lids normal.  Ears: normal shape and symmetry, no tenderness, no discharge. External canals are without any significant edema or erythema. Tympanic membranes are without any inflammation, no effusion. Gross auditory acuity is intact.  Nose: symmetrical without tenderness, no discharge.  Mouth/Throat: reasonable " hygiene, no erythema, exudates or tonsillar enlargement.  Neck: no masses, range of motion within normal limits, no tracheal deviation. No obvious thyroid enlargement.   Lymph: no cervical adenopathy. No supraclavicular adenopathy.   Neuro: alert and oriented. Cranial nerves 1-12 grossly intact. No sensory deficit.   Cardiovascular: regular rate and rhythm. No edema.  Pulmonary: no distress. Chest is symmetrical with respiration, no wheezes, crackles, or rhonchi.   Abdomen: soft, minimal epigastric tenderness, no guarding, no hepatosplenomegaly.  Musculoskeletal: no clubbing, appropriate muscle tone, gait is stable.  Skin: warm, dry, intact, no clubbing, no cyanosis, no rashes.   Psych: appropriate mood, affect, judgement.         Assessment/Plan:  1. Acute epigastric pain  CBC WITH DIFFERENTIAL    COMP METABOLIC PANEL    LIPASE    omeprazole (PRILOSEC) 20 MG delayed-release capsule         Lab Results   Component Value Date/Time    WBC 4.9 12/30/2017 07:50 AM    RBC 4.78 12/30/2017 07:50 AM    HEMOGLOBIN 15.4 12/30/2017 07:50 AM    HEMATOCRIT 44.6 12/30/2017 07:50 AM    MCV 93.3 12/30/2017 07:50 AM    MCH 32.2 12/30/2017 07:50 AM    MCHC 34.5 12/30/2017 07:50 AM    MPV 9.1 12/30/2017 07:50 AM    NEUTSPOLYS 56.70 12/30/2017 07:50 AM    LYMPHOCYTES 30.50 12/30/2017 07:50 AM    MONOCYTES 9.90 12/30/2017 07:50 AM    EOSINOPHILS 2.30 12/30/2017 07:50 AM    BASOPHILS 0.60 12/30/2017 07:50 AM      Lab Results   Component Value Date/Time    SODIUM 140 12/30/2017 07:50 AM    POTASSIUM 3.8 12/30/2017 07:50 AM    CHLORIDE 102 12/30/2017 07:50 AM    CO2 29 12/30/2017 07:50 AM    GLUCOSE 76 12/30/2017 07:50 AM    BUN 11 12/30/2017 07:50 AM    CREATININE 0.97 12/30/2017 07:50 AM      Lipase 21      CBC, CMP, and lipase are all normal. The patient's symptoms were improved with Pepto-Bismol, therefore suspect GI etiology. She'll be given a trial of Prilosec, take as directed.  Supportive care, differential diagnoses, and  indications for immediate follow-up discussed with patient.   Pathogenesis of diagnosis discussed including typical length and natural progression.   Instructed to return to clinic or nearest emergency department for any change in condition, further concerns, or worsening of symptoms.  Patient states understanding of the plan of care and discharge instructions.  Instructed to make an appointment, for follow up, with her primary care provider.        Please note that this dictation was created using voice recognition software. I have made every reasonable attempt to correct obvious errors, but I expect that there are errors of grammar and possibly content that I did not discover before finalizing the note.      DEANDRE Altman.

## 2018-01-02 ENCOUNTER — HOSPITAL ENCOUNTER (OUTPATIENT)
Dept: RADIOLOGY | Facility: MEDICAL CENTER | Age: 53
End: 2018-01-02
Attending: FAMILY MEDICINE
Payer: OTHER GOVERNMENT

## 2018-01-02 DIAGNOSIS — Z12.31 ENCOUNTER FOR SCREENING MAMMOGRAM FOR MALIGNANT NEOPLASM OF BREAST: ICD-10-CM

## 2018-01-02 PROCEDURE — 77067 SCR MAMMO BI INCL CAD: CPT

## 2018-01-09 DIAGNOSIS — E78.5 DYSLIPIDEMIA: ICD-10-CM

## 2018-01-15 ENCOUNTER — HOSPITAL ENCOUNTER (OUTPATIENT)
Dept: LAB | Facility: MEDICAL CENTER | Age: 53
End: 2018-01-15
Attending: FAMILY MEDICINE
Payer: OTHER GOVERNMENT

## 2018-01-15 DIAGNOSIS — E78.5 DYSLIPIDEMIA: ICD-10-CM

## 2018-01-15 LAB
25(OH)D3 SERPL-MCNC: 37 NG/ML (ref 30–100)
ALBUMIN SERPL BCP-MCNC: 4.3 G/DL (ref 3.2–4.9)
ALBUMIN/GLOB SERPL: 1.5 G/DL
ALP SERPL-CCNC: 49 U/L (ref 30–99)
ALT SERPL-CCNC: 18 U/L (ref 2–50)
ANION GAP SERPL CALC-SCNC: 7 MMOL/L (ref 0–11.9)
AST SERPL-CCNC: 23 U/L (ref 12–45)
BILIRUB SERPL-MCNC: 0.8 MG/DL (ref 0.1–1.5)
BUN SERPL-MCNC: 19 MG/DL (ref 8–22)
CALCIUM SERPL-MCNC: 9.7 MG/DL (ref 8.5–10.5)
CHLORIDE SERPL-SCNC: 106 MMOL/L (ref 96–112)
CHOLEST SERPL-MCNC: 230 MG/DL (ref 100–199)
CO2 SERPL-SCNC: 27 MMOL/L (ref 20–33)
CREAT SERPL-MCNC: 1.03 MG/DL (ref 0.5–1.4)
GLOBULIN SER CALC-MCNC: 2.8 G/DL (ref 1.9–3.5)
GLUCOSE SERPL-MCNC: 81 MG/DL (ref 65–99)
HDLC SERPL-MCNC: 85 MG/DL
LDLC SERPL CALC-MCNC: 132 MG/DL
POTASSIUM SERPL-SCNC: 3.8 MMOL/L (ref 3.6–5.5)
PROT SERPL-MCNC: 7.1 G/DL (ref 6–8.2)
SODIUM SERPL-SCNC: 140 MMOL/L (ref 135–145)
TRIGL SERPL-MCNC: 66 MG/DL (ref 0–149)

## 2018-01-15 PROCEDURE — 82306 VITAMIN D 25 HYDROXY: CPT

## 2018-01-15 PROCEDURE — 36415 COLL VENOUS BLD VENIPUNCTURE: CPT

## 2018-01-15 PROCEDURE — 80061 LIPID PANEL: CPT

## 2018-01-15 PROCEDURE — 80053 COMPREHEN METABOLIC PANEL: CPT

## 2018-01-19 ENCOUNTER — OFFICE VISIT (OUTPATIENT)
Dept: MEDICAL GROUP | Facility: PHYSICIAN GROUP | Age: 53
End: 2018-01-19
Payer: OTHER GOVERNMENT

## 2018-01-19 VITALS
SYSTOLIC BLOOD PRESSURE: 108 MMHG | TEMPERATURE: 98.7 F | BODY MASS INDEX: 21.9 KG/M2 | WEIGHT: 116 LBS | DIASTOLIC BLOOD PRESSURE: 74 MMHG | HEART RATE: 72 BPM | HEIGHT: 61 IN | OXYGEN SATURATION: 96 %

## 2018-01-19 DIAGNOSIS — E78.5 DYSLIPIDEMIA: ICD-10-CM

## 2018-01-19 DIAGNOSIS — Z00.00 WELL ADULT ON ROUTINE HEALTH CHECK: ICD-10-CM

## 2018-01-19 PROCEDURE — 99396 PREV VISIT EST AGE 40-64: CPT | Performed by: FAMILY MEDICINE

## 2018-01-19 ASSESSMENT — PATIENT HEALTH QUESTIONNAIRE - PHQ9: CLINICAL INTERPRETATION OF PHQ2 SCORE: 0

## 2018-01-19 NOTE — ASSESSMENT & PLAN NOTE
Ongoing issue. Patient is here today to review her lab work. Last completed within the last week and then reviewed in detail with the patient. It does show an elevated total cholesterol and LDL cholesterol. We did talk about this and patient does report that she did have an increase in high fat foods over the holidays.

## 2018-01-19 NOTE — PROGRESS NOTES
"Subjective:   Suzette Anderson is a 52 y.o. female here today for elevated lipids    Dyslipidemia  Ongoing issue. Patient is here today to review her lab work. Last completed within the last week and then reviewed in detail with the patient. It does show an elevated total cholesterol and LDL cholesterol. We did talk about this and patient does report that she did have an increase in high fat foods over the holidays.         Current medicines (including changes today)  Current Outpatient Prescriptions   Medication Sig Dispense Refill   • fluticasone (FLONASE) 50 MCG/ACT nasal spray Spray 1 Spray in nose every day. 16 g 5   • valacyclovir (VALTREX) 1 GM Tab Take 1 Tab by mouth 1 time daily as needed (take for 5 days when having an outbreak). 20 Tab 3   • albuterol (PROAIR HFA) 108 (90 BASE) MCG/ACT AERS inhalation aerosol Inhale 2 Puffs by mouth every 6 hours as needed. 8.5 g 3     No current facility-administered medications for this visit.      She  has a past medical history of Acne; Allergic rhinitis due to allergen (4/2/2014); Dyslipidemia; and Osteopenia.    ROS   No chest pain, no shortness of breath, no abdominal pain       Objective:     Blood pressure 108/74, pulse 72, temperature 37.1 °C (98.7 °F), height 1.549 m (5' 1\"), weight 52.6 kg (116 lb), last menstrual period 01/01/2001, SpO2 96 %. Body mass index is 21.92 kg/m².   Physical Exam:  Alert, oriented in no acute distress.  Eye contact is good, speech goal directed, affect calm  HEENT: conjunctiva non-injected, sclera non-icteric.  Pinna normal. TM pearly gray.   Oral mucous membranes pink and moist with no lesions.  Neck No adenopathy or masses in the neck or supraclavicular regions.  Lungs: clear to auscultation bilaterally with good excursion.  CV: regular rate and rhythm.  Abdomen: soft, nontender, No CVAT  Ext: no edema, color normal, vascularity normal, temperature normal  Neuro: CN 2-12 grossly intact      Assessment and Plan:   The following " treatment plan was discussed     1. Well adult on routine health check      No acute findings on exam; last visit and reviewed in detail with the patient today. Health maintenance of DEXA scan will be due next year and Pap smear in 2020   2. Dyslipidemia  COMP METABOLIC PANEL    LIPID PROFILE    Uncontrolled. Encourage patient to choose healthy eating habits and recheck labs in 6 months       Followup: Return in about 1 year (around 1/19/2019) for annual physical, Short.

## 2018-03-02 DIAGNOSIS — R09.81 SINUS CONGESTION: ICD-10-CM

## 2018-03-06 RX ORDER — FLUTICASONE PROPIONATE 50 MCG
SPRAY, SUSPENSION (ML) NASAL
Qty: 16 G | Refills: 5 | Status: SHIPPED | OUTPATIENT
Start: 2018-03-06

## 2021-03-15 DIAGNOSIS — Z23 NEED FOR VACCINATION: ICD-10-CM
